# Patient Record
Sex: MALE
[De-identification: names, ages, dates, MRNs, and addresses within clinical notes are randomized per-mention and may not be internally consistent; named-entity substitution may affect disease eponyms.]

---

## 2020-09-24 ENCOUNTER — TRANSCRIPTION ENCOUNTER (OUTPATIENT)
Age: 35
End: 2020-09-24

## 2021-01-11 ENCOUNTER — TRANSCRIPTION ENCOUNTER (OUTPATIENT)
Age: 36
End: 2021-01-11

## 2021-03-12 ENCOUNTER — TRANSCRIPTION ENCOUNTER (OUTPATIENT)
Age: 36
End: 2021-03-12

## 2022-08-29 ENCOUNTER — TRANSCRIPTION ENCOUNTER (OUTPATIENT)
Age: 37
End: 2022-08-29

## 2022-08-29 ENCOUNTER — APPOINTMENT (OUTPATIENT)
Dept: OTOLARYNGOLOGY | Facility: CLINIC | Age: 37
End: 2022-08-29

## 2022-08-29 VITALS
HEART RATE: 67 BPM | WEIGHT: 180 LBS | BODY MASS INDEX: 29.99 KG/M2 | SYSTOLIC BLOOD PRESSURE: 129 MMHG | OXYGEN SATURATION: 98 % | HEIGHT: 65 IN | TEMPERATURE: 97.7 F | DIASTOLIC BLOOD PRESSURE: 84 MMHG

## 2022-08-29 DIAGNOSIS — J34.2 DEVIATED NASAL SEPTUM: ICD-10-CM

## 2022-08-29 DIAGNOSIS — G47.9 SLEEP DISORDER, UNSPECIFIED: ICD-10-CM

## 2022-08-29 DIAGNOSIS — M26.609 UNSPECIFIED TEMPOROMANDIBULAR JOINT DISORDER: ICD-10-CM

## 2022-08-29 PROCEDURE — 99205 OFFICE O/P NEW HI 60 MIN: CPT | Mod: 25

## 2022-08-29 PROCEDURE — 31579 LARYNGOSCOPY TELESCOPIC: CPT

## 2022-08-29 NOTE — ASSESSMENT
[FreeTextEntry1] :  37-year-old gentleman who presents with concern for chronic throat discomfort.  Based on history and physical exam, I do believe there is a component of laryngopharyngeal reflux.  At this time I am recommending dietary and behavioral change to reduce acid in the diet.  I am also recommending omeprazole as well famotidine for a 3 months trial.  \par \par Additionally exam findings and history are also consistent with obstructive sleep apnea.   I am recommending sleep consultation as well as polysomnogram.  In the interim I am also recommending that he continue to use his bite block for his known tooth grinding/TMJ.\par \par   Patient to follow-up in 2 to 3 months, sooner should symptoms worsen or fail to improve\par \par -  sleep medicine consultation, polysomnogram\par -  continue bite block\par - Dietary and behavioral modification to reduce acid reflux, handout given\par - Omeprazole qd as well as Famotidine qhs\par - Voice hygiene, increase hydration, sips of water throughout the day, avoid throat clearing\par - fu 3 mo\par

## 2022-08-29 NOTE — PROCEDURE
[de-identified] : -\par Procedure Note\par \par Pre-operative Diagnosis: Persistent throat discomfort\par Post-operative Diagnosis:  concern for obstructive sleep apnea, LPR, left-sided septal deviation\par Anesthesia: Topical - 1 % Lidocaine/Phenylephrine\par Procedure:  Flexible Laryngoscopy with Stroboscopy\par  \par Procedure Details:  \par The patient was placed in the sitting position.  After decongestant and anesthesia were applied the laryngoscope was passed.  The nasal cavities, nasopharynx, oropharynx, hypopharynx, and larynx were all examined.  Vocal folds were examined during respiration and phonation.  The following findings were noted:\par \par Findings:  \par Nose: Septum is midline, turbinates are normal, nasal airways patent, mucosa normal\par Nasopharynx: Adenoids normal, no masses, eustachian tube normal\par Oropharynx: Pharyngeal walls symmetric and without lesion. Tonsils/fossae symmetric\par Hypopharynx: Hypopharynx and pyriform sinuses without lesion. No masses or asymmetry.  No pooling of secretions.  weakening of the pharyngeal muscles circumferentially\par Larynx:  Epiglottis and aryepiglottic folds were sharp and crisp bilaterally.  Bilateral false vocal folds normal appearance. Airway was widely patent.  + postcricoid edema\par \par Strobe Exam Ratings\par 		\par TVF Appearance:  normal\par TVF Mobility:  normal\par Edema/hypertrophy:  mild\par Mucus on TVF:  minimal\par Glottic Closure:  adequate\par Mucosal Wave:  normal \par Amplitude: Normal\par Phase: symmetric\par Supraglottic Hyperfunction: none\par Other Findings: none\par \par Condition: Stable.  Patient tolerated procedure well.\par \par Complications: None\par

## 2022-08-29 NOTE — HISTORY OF PRESENT ILLNESS
[de-identified] : 38 yo male who presents with \par \par Was in [place], returned July 22nd, since that time fatigue, sore throat, cough.  Symptoms are constant.  Mild odynophagia.  No issues chewing, eating or swallowing.  No breathing issues.  1 Day of dysphonia, but this then improved back to baseline.  Works in sales, and used voice throughout the day.  He was treated with over the counter chloretic sprays.  Went to urgent care on August 22nd, COVID negative, strep was negative, Dx with acid and treated with famotidine.  He has been taking the medication, with very mild improvement.\par \par Diet:\par + 2 caffeinated energy drinks daily, tomato, mint, citrus, carbonated beverages, onion, garlic,  \par - chocolate, blueberries\par 2 glasses of water daily.\par

## 2022-09-08 ENCOUNTER — APPOINTMENT (OUTPATIENT)
Dept: PULMONOLOGY | Facility: CLINIC | Age: 37
End: 2022-09-08

## 2022-09-08 VITALS
HEIGHT: 65 IN | WEIGHT: 180 LBS | HEART RATE: 87 BPM | BODY MASS INDEX: 29.99 KG/M2 | SYSTOLIC BLOOD PRESSURE: 125 MMHG | OXYGEN SATURATION: 98 % | DIASTOLIC BLOOD PRESSURE: 84 MMHG | TEMPERATURE: 97.1 F

## 2022-09-08 DIAGNOSIS — G47.63 SLEEP RELATED BRUXISM: ICD-10-CM

## 2022-09-08 PROCEDURE — 99204 OFFICE O/P NEW MOD 45 MIN: CPT

## 2022-09-08 NOTE — REVIEW OF SYSTEMS
[EDS: ESS=____] : daytime somnolence: ESS=[unfilled] [Fatigue] : fatigue [Snoring] : snoring [Obesity] : obesity [Negative] : Psychiatric

## 2022-09-09 NOTE — HISTORY OF PRESENT ILLNESS
[FreeTextEntry1] : 09/08/2022 :  BHUPENDRA SWARTZ is a 37 year old male with PMHx seasonal  allergy, deviated septum, who is here for initial evaluation for possible sleep apnea. \par \par \par He takes melatonin x 3 years 30 min before bed time and was traveling for work was taking Ambien which he felt  made his snoring worse and now he only takes it occasionally. He had sleep study 6 years ago and was told he has mild-moderate apnea but was never treated with CPAP. \par \par he also reports bruxism and wears . Denies dozing off and has occasional morning HA \par \par Minimal excessive daytime somnolence with [place] sleepiness scale (out of 24 points) = 4. \par \par Denies morning headache, parasomnia, restless legs, leg movements, cataplexy or sleep paralysis. \par

## 2022-09-09 NOTE — PHYSICAL EXAM
[General Appearance - In No Acute Distress] : no acute distress [Normal Oropharynx] : normal oropharynx [III] : III [Neck Appearance] : the appearance of the neck was normal [Neck Cervical Mass (___cm)] : no neck mass was observed [Apical Impulse] : the apical impulse was normal [Heart Sounds] : normal S1 and S2 [Exaggerated Use Of Accessory Muscles For Inspiration] : no accessory muscle use [Abnormal Walk] : normal gait [Involuntary Movements] : no involuntary movements were seen [No Focal Deficits] : no focal deficits [Oriented To Time, Place, And Person] : oriented to person, place, and time [Affect] : the affect was normal [Nail Clubbing] : no clubbing of the fingernails [Cyanosis, Localized] : no localized cyanosis [Petechial Hemorrhages (___cm)] : no petechial hemorrhages [] : no ischemic changes

## 2022-09-09 NOTE — ASSESSMENT
[FreeTextEntry1] : 38 y/o M with snoring, GERD, bruxism who is here for initial visit.   Based on history and physical exam, sleep disordered breathing is at least moderately likely.  The patient was advised to have overnight unattended home sleep testing as a first approach.  If this is not definitive may need overnight polysomnography. Will be seen in follow up after testing.  Bruxism  is common with many sleep disturbances; if has mild to moderate obstructive sleep apnea may consider rx w oral appliance (mandibular advancer) to both treat sleep disordered breathing and protect teeth.

## 2022-09-19 ENCOUNTER — OUTPATIENT (OUTPATIENT)
Dept: OUTPATIENT SERVICES | Facility: HOSPITAL | Age: 37
LOS: 1 days | End: 2022-09-19
Payer: COMMERCIAL

## 2022-09-19 ENCOUNTER — APPOINTMENT (OUTPATIENT)
Dept: SLEEP CENTER | Facility: HOME HEALTH | Age: 37
End: 2022-09-19

## 2022-09-19 PROCEDURE — 95800 SLP STDY UNATTENDED: CPT | Mod: 26

## 2022-09-19 PROCEDURE — 95800 SLP STDY UNATTENDED: CPT

## 2022-09-20 DIAGNOSIS — G47.33 OBSTRUCTIVE SLEEP APNEA (ADULT) (PEDIATRIC): ICD-10-CM

## 2022-10-11 ENCOUNTER — APPOINTMENT (OUTPATIENT)
Dept: PULMONOLOGY | Facility: CLINIC | Age: 37
End: 2022-10-11

## 2022-10-11 DIAGNOSIS — R06.81 APNEA, NOT ELSEWHERE CLASSIFIED: ICD-10-CM

## 2022-10-11 DIAGNOSIS — R06.83 SNORING: ICD-10-CM

## 2022-10-11 DIAGNOSIS — F45.8 OTHER SOMATOFORM DISORDERS: ICD-10-CM

## 2022-10-11 PROCEDURE — 99214 OFFICE O/P EST MOD 30 MIN: CPT | Mod: 95

## 2022-10-11 NOTE — HISTORY OF PRESENT ILLNESS
[FreeTextEntry1] : 09/08/2022 :  BHUPENDRA SWARTZ is a 37 year old male with PMHx seasonal  allergy, deviated septum, who is here for initial evaluation for possible sleep apnea. \par \par He takes melatonin x 3 years 30 min before bed time and was traveling for work was taking Ambien which he felt  made his snoring worse and now he only takes it occasionally. He had sleep study 6 years ago and was told he has mild-moderate apnea but was never treated with CPAP. \par \par he also reports bruxism and wears . Denies dozing off and has occasional morning HA \par \par Minimal excessive daytime somnolence with [place] sleepiness scale (out of 24 points) = 4. \par \par Denies morning headache, parasomnia, restless legs, leg movements, cataplexy or sleep paralysis. \par \par 10/11/22: f/u visit (Telehealth)- reviewed recent unattended home sleep testing with patient-  good sleep quality, snoring only short time when supine, mild sleep disordered breathing with Apnea Hypopnea Index 5.2 (4%), 11.2 (3%).  No significant medical history or changes in medication since last visit. \par

## 2022-10-11 NOTE — ASSESSMENT
[FreeTextEntry1] : Treatment options for sleep disordered breathing were discussed.  The most rapid and successful treatment remains nasal CPAP or BilevelPAP.  Alternatives include upper airway surgery such as uvulopharyngoplasty or a dental appliance (better for milder cases).  Recently hypoglossal nerve stimulation has been used.  Positional therapy (avoidance of supine posture) can be helpful, and all patients should try to maintain a healthy weight and avoid alcohol or other sedating medications close to bedtime \par \par Given mild obstructive sleep apnea and little or no excessive daytime somnolence suggest oral appliance (mandibular advancer) which may also be helpful for his bruxism.  Gave list of dental experts. F/U after dental evaluation.

## 2022-11-23 ENCOUNTER — RX RENEWAL (OUTPATIENT)
Age: 37
End: 2022-11-23

## 2022-11-26 ENCOUNTER — NON-APPOINTMENT (OUTPATIENT)
Age: 37
End: 2022-11-26

## 2022-11-29 ENCOUNTER — APPOINTMENT (OUTPATIENT)
Dept: OTOLARYNGOLOGY | Facility: CLINIC | Age: 37
End: 2022-11-29
Payer: COMMERCIAL

## 2022-11-29 VITALS
TEMPERATURE: 97.4 F | SYSTOLIC BLOOD PRESSURE: 122 MMHG | WEIGHT: 315 LBS | HEIGHT: 65 IN | OXYGEN SATURATION: 97 % | HEART RATE: 82 BPM | BODY MASS INDEX: 52.48 KG/M2 | DIASTOLIC BLOOD PRESSURE: 81 MMHG

## 2022-11-29 DIAGNOSIS — R07.0 PAIN IN THROAT: ICD-10-CM

## 2022-11-29 DIAGNOSIS — K21.9 GASTRO-ESOPHAGEAL REFLUX DISEASE W/OUT ESOPHAGITIS: ICD-10-CM

## 2022-11-29 DIAGNOSIS — R09.81 NASAL CONGESTION: ICD-10-CM

## 2022-11-29 DIAGNOSIS — R49.0 DYSPHONIA: ICD-10-CM

## 2022-11-29 PROCEDURE — 99215 OFFICE O/P EST HI 40 MIN: CPT | Mod: 25

## 2022-11-29 PROCEDURE — 31579 LARYNGOSCOPY TELESCOPIC: CPT

## 2022-11-29 PROCEDURE — 99214 OFFICE O/P EST MOD 30 MIN: CPT | Mod: 25

## 2022-11-30 PROBLEM — K21.9 LARYNGOPHARYNGEAL REFLUX (LPR): Status: ACTIVE | Noted: 2022-08-29

## 2022-11-30 PROBLEM — R07.0 THROAT DISCOMFORT: Status: ACTIVE | Noted: 2022-08-29

## 2022-11-30 PROBLEM — R49.0 DYSPHONIA: Status: ACTIVE | Noted: 2022-11-30

## 2022-11-30 NOTE — REASON FOR VISIT
[Subsequent Evaluation] : a subsequent evaluation for [FreeTextEntry2] : Ear discomfort, throat discomfort

## 2022-11-30 NOTE — ASSESSMENT
[FreeTextEntry1] :  37-year-old gentleman who presents with concern for chronic throat discomfort.  Based on history and physical exam, I do believe there is a component of laryngopharyngeal reflux.  At this time I am recommending dietary and behavioral change to reduce acid in the diet.  I am also recommending omeprazole as well famotidine for a 3 months trial.  The patient has had improvement on this regimen and I am recommending continuing this at this time.\par \par Additionally exam findings and history are also consistent with obstructive sleep apnea.   I am recommending sleep consultation as well as polysomnogram.  In the interim I am also recommending that he continue to use his bite block for his known tooth grinding/TMJ.    Sleep study was done with AHI = 11.  Sleep medicine recommended oral advancement appliance which the patient is already using.\par \par   In terms of throat discomfort, dysphonia as well as ear clogging sensation believe this is consistent with an upper respiratory infection with secondary eustachian tube dysfunction.  There is also evidence of some tonsillar hypertrophy.  At this time I did send off a throat culture.  I am also recommending nasal saline irrigation as well as nasal steroids.  Also recommending an oral Medrol Dosepak as well.  We also briefly reviewed "fly and dive" instructions for acute nasal decongestant.  Patient will follow-up in 2 to 3 weeks for repeat evaluation.  We will have a telehealth in the interim when throat cultures return.\par \par -  rest, hydration, Tylenol/Motrin\par - Nasal saline, nasal steroids\par - Medrol Dosepak\par -  follow-up 2 to 3 weeks, we will plan telehealth to review culture results\par -  continue bite block/  Oral advancement appliance\par - Dietary and behavioral modification to reduce acid reflux, handout given\par - Omeprazole qd as well as Famotidine qhs\par - Voice hygiene, increase hydration, sips of water throughout the day, avoid throat clearing\par - fu 3 mo\par

## 2022-11-30 NOTE — HISTORY OF PRESENT ILLNESS
[de-identified] : 36 yo male who presents with throat discomfort.  Was in [place], returned July 22nd, since that time fatigue, sore throat, cough.  Symptoms are constant.  Mild odynophagia.  No issues chewing, eating or swallowing.  No breathing issues.  1 Day of dysphonia, but this then improved back to baseline.  Works in sales, and used voice throughout the day.  He was treated with over the counter chloretic sprays.  Went to urgent care on August 22nd, COVID negative, strep was negative, Dx with acid and treated with famotidine.  He has been taking the medication, with very mild improvement.\par \par Diet:\par + 2 caffeinated energy drinks daily, tomato, mint, citrus, carbonated beverages, onion, garlic,  \par - chocolate, blueberries\par 2 glasses of water daily.\par - [FreeTextEntry1] :  11/29/2022\par  since the last visit the patient has been adhering to a low reflux diet as well as taking the medications.  He has noted improvement in terms of symptoms.  He also completed a polysomnogram and was found to have mild obstructive sleep apnea.    Minimal treatment including mandibular advancement was offered by sleep medicine.\par \par   Today the patient presents with sore throat throat discomfort ear clogging sensation, pain when swallowing.    He also has dysphonia and hoarseness of voice.  This is been going on for 4 days.  Patient also notes some congestion and postnasal drip.   he has been seen in urgent care and strep was negative.  He was advised to treat with rest, hydration, Tylenol/Motrin.  Today he presents for second opinion.

## 2022-11-30 NOTE — PROCEDURE
[de-identified] : -\par Procedure Note\par \par Pre-operative Diagnosis: Persistent throat discomfort\par Post-operative Diagnosis: concern for obstructive sleep apnea, LPR, left-sided septal deviation,  irritation erythema and edema of the vocal folds small punctate lesion mid right vocal fold\par Anesthesia: Topical - 1 % Lidocaine/Phenylephrine\par Procedure: Flexible Laryngoscopy with Stroboscopy\par  \par Procedure Details: \par The patient was placed in the sitting position. After decongestant and anesthesia were applied the laryngoscope was passed. The nasal cavities, nasopharynx, oropharynx, hypopharynx, and larynx were all examined. Vocal folds were examined during respiration and phonation. The following findings were noted:\par \par Findings: \par Nose: Septum is midline, turbinates are normal, nasal airways patent, mucosa normal\par Nasopharynx: Adenoids normal, no masses, eustachian tube normal\par Oropharynx: Pharyngeal walls symmetric and without lesion. Tonsils/fossae symmetric\par Hypopharynx: Hypopharynx and pyriform sinuses without lesion. No masses or asymmetry. No pooling of secretions. weakening of the pharyngeal muscles circumferentially\par Larynx: Epiglottis and aryepiglottic folds were sharp and crisp bilaterally. Bilateral false vocal folds normal appearance. Airway was widely patent. + postcricoid edema\par \par Strobe Exam Ratings\par 		\par TVF Appearance:  irritation erythema and edema of the vocal folds small punctate lesion mid right vocal fold\par TVF Mobility: normal\par Edema/hypertrophy: mild\par Mucus on TVF: minimal\par Glottic Closure: adequate\par Mucosal Wave: normal \par Amplitude: Normal\par Phase: symmetric\par Supraglottic Hyperfunction: none\par Other Findings: none\par \par Condition: Stable. Patient tolerated procedure well.\par \par Complications: None\par

## 2022-11-30 NOTE — PHYSICAL EXAM
[FreeTextEntry1] :   Hoarse and raspy voice [Nasal Endoscopy Performed] : nasal endoscopy was performed, see procedure section for findings [] : septum deviated to the left [Midline] : trachea located in midline position [Laryngoscopy Performed] : laryngoscopy was performed, see procedure section for findings [Normal] : no rashes

## 2022-12-13 ENCOUNTER — APPOINTMENT (OUTPATIENT)
Dept: OTOLARYNGOLOGY | Facility: CLINIC | Age: 37
End: 2022-12-13

## 2022-12-13 VITALS
HEART RATE: 86 BPM | SYSTOLIC BLOOD PRESSURE: 119 MMHG | BODY MASS INDEX: 28.16 KG/M2 | TEMPERATURE: 96.7 F | HEIGHT: 65 IN | DIASTOLIC BLOOD PRESSURE: 79 MMHG | WEIGHT: 169 LBS

## 2022-12-13 DIAGNOSIS — J06.9 ACUTE UPPER RESPIRATORY INFECTION, UNSPECIFIED: ICD-10-CM

## 2022-12-13 LAB — EAR NOSE AND THROAT CULTURE: ABNORMAL

## 2022-12-13 PROCEDURE — 99213 OFFICE O/P EST LOW 20 MIN: CPT

## 2022-12-13 NOTE — ASSESSMENT
[FreeTextEntry1] :  37-year-old gentleman who presents with concern for chronic throat discomfort.  Based on history and physical exam, I do believe there is a component of laryngopharyngeal reflux.  At this time I am recommending dietary and behavioral change to reduce acid in the diet.  I am also recommending omeprazole as well famotidine for a 3 months trial.  The patient has had improvement on this regimen and I am recommending continuing this at this time.\par \par Additionally exam findings and history are also consistent with obstructive sleep apnea.   I am recommending sleep consultation as well as polysomnogram.  In the interim I am also recommending that he continue to use his bite block for his known tooth grinding/TMJ.    Sleep study was done with AHI = 11.  Sleep medicine recommended oral advancement appliance which the patient is already using.\par \par In terms of recent upper respiratory infection patient feels completely back to baseline   After using conservative treatments.  Culture was reviewed with essentially normal respiratory keyla.  Therefore I do suspect that this was viral in etiology.\par \par -  continue bite block/  Oral advancement appliance\par - Dietary and behavioral modification to reduce acid reflux, handout given\par - Omeprazole qd as well as Famotidine qhs\par - Voice hygiene, increase hydration, sips of water throughout the day, avoid throat clearing\par - fu 3 mo\par

## 2022-12-13 NOTE — REASON FOR VISIT
[Subsequent Evaluation] : a subsequent evaluation for [FreeTextEntry2] : Upper respiratory infection

## 2022-12-13 NOTE — HISTORY OF PRESENT ILLNESS
[de-identified] : 36 yo male who presents with throat discomfort.  Was in [place], returned July 22nd, since that time fatigue, sore throat, cough.  Symptoms are constant.  Mild odynophagia.  No issues chewing, eating or swallowing.  No breathing issues.  1 Day of dysphonia, but this then improved back to baseline.  Works in sales, and used voice throughout the day.  He was treated with over the counter chloretic sprays.  Went to urgent care on August 22nd, COVID negative, strep was negative, Dx with acid and treated with famotidine.  He has been taking the medication, with very mild improvement.\par \par Diet:\par + 2 caffeinated energy drinks daily, tomato, mint, citrus, carbonated beverages, onion, garlic,  \par - chocolate, blueberries\par 2 glasses of water daily.\par - [FreeTextEntry1] :  11/29/2022\par  since the last visit the patient has been adhering to a low reflux diet as well as taking the medications.  He has noted improvement in terms of symptoms.  He also completed a polysomnogram and was found to have mild obstructive sleep apnea.    Minimal treatment including mandibular advancement was offered by sleep medicine.\par \par   Today the patient presents with sore throat throat discomfort ear clogging sensation, pain when swallowing.    He also has dysphonia and hoarseness of voice.  This is been going on for 4 days.  Patient also notes some congestion and postnasal drip.   he has been seen in urgent care and strep was negative.  He was advised to treat with rest, hydration, Tylenol/Motrin.  Today he presents for second opinion.\par –\par 12/13/2022\par  since last visit the patient has been adhering to rest, hydration, Tylenol/Motrin.  He did use nasal saline and nasal steroids.  He also did the oral steroids.  He did not try the "fly and dive" instructions.  He notes complete resolution of symptoms.  He feels completely back to baseline.

## 2023-08-30 ENCOUNTER — TRANSCRIPTION ENCOUNTER (OUTPATIENT)
Age: 38
End: 2023-08-30

## 2023-08-30 RX ORDER — OMEPRAZOLE 40 MG/1
40 CAPSULE, DELAYED RELEASE ORAL
Qty: 90 | Refills: 1 | Status: ACTIVE | COMMUNITY
Start: 2022-08-29 | End: 1900-01-01

## 2023-08-30 RX ORDER — FAMOTIDINE 20 MG/1
20 TABLET, FILM COATED ORAL
Qty: 90 | Refills: 1 | Status: ACTIVE | COMMUNITY
Start: 2022-08-29 | End: 1900-01-01

## 2024-04-15 ENCOUNTER — APPOINTMENT (OUTPATIENT)
Dept: INTERNAL MEDICINE | Facility: CLINIC | Age: 39
End: 2024-04-15
Payer: COMMERCIAL

## 2024-04-15 VITALS
WEIGHT: 177 LBS | BODY MASS INDEX: 29.49 KG/M2 | SYSTOLIC BLOOD PRESSURE: 120 MMHG | TEMPERATURE: 98.5 F | DIASTOLIC BLOOD PRESSURE: 70 MMHG | OXYGEN SATURATION: 98 % | HEART RATE: 69 BPM | RESPIRATION RATE: 12 BRPM | HEIGHT: 65 IN

## 2024-04-15 DIAGNOSIS — Z78.9 OTHER SPECIFIED HEALTH STATUS: ICD-10-CM

## 2024-04-15 DIAGNOSIS — L65.9 NONSCARRING HAIR LOSS, UNSPECIFIED: ICD-10-CM

## 2024-04-15 DIAGNOSIS — Z00.00 ENCOUNTER FOR GENERAL ADULT MEDICAL EXAMINATION W/OUT ABNORMAL FINDINGS: ICD-10-CM

## 2024-04-15 DIAGNOSIS — F32.A DEPRESSION, UNSPECIFIED: ICD-10-CM

## 2024-04-15 DIAGNOSIS — Z87.891 PERSONAL HISTORY OF NICOTINE DEPENDENCE: ICD-10-CM

## 2024-04-15 PROCEDURE — 99385 PREV VISIT NEW AGE 18-39: CPT | Mod: 25

## 2024-04-15 PROCEDURE — G0444 DEPRESSION SCREEN ANNUAL: CPT | Mod: 59

## 2024-04-15 RX ORDER — METHYLPREDNISOLONE 4 MG/1
4 TABLET ORAL
Qty: 1 | Refills: 0 | Status: COMPLETED | COMMUNITY
Start: 2022-11-29 | End: 2024-04-15

## 2024-04-15 RX ORDER — FLUTICASONE PROPIONATE 50 UG/1
50 SPRAY, METERED NASAL DAILY
Qty: 1 | Refills: 3 | Status: COMPLETED | COMMUNITY
Start: 2022-11-29 | End: 2024-04-15

## 2024-04-15 RX ORDER — FINASTERIDE 1 MG/1
1 TABLET ORAL
Refills: 0 | Status: ACTIVE | COMMUNITY

## 2024-04-15 RX ORDER — AMOXICILLIN 875 MG/1
875 TABLET, FILM COATED ORAL
Qty: 20 | Refills: 0 | Status: COMPLETED | COMMUNITY
Start: 2023-10-30

## 2024-04-15 NOTE — REVIEW OF SYSTEMS
[Fever] : no fever [Chills] : no chills [Night Sweats] : no night sweats [Discharge] : no discharge [Vision Problems] : no vision problems [Itching] : no itching [Earache] : no earache [Nasal Discharge] : no nasal discharge [Sore Throat] : no sore throat [Chest Pain] : no chest pain [Palpitations] : no palpitations [Lower Ext Edema] : no lower extremity edema [Shortness Of Breath] : no shortness of breath [Wheezing] : no wheezing [Cough] : no cough [Dyspnea on Exertion] : not dyspnea on exertion [Abdominal Pain] : no abdominal pain [Nausea] : no nausea [Constipation] : no constipation [Diarrhea] : no diarrhea [Vomiting] : no vomiting [Melena] : no melena [Dysuria] : no dysuria [Muscle Pain] : no muscle pain [Muscle Weakness] : no muscle weakness [Itching] : no itching [Skin Rash] : no skin rash [Headache] : no headache [Dizziness] : no dizziness [Suicidal] : not suicidal [Anxiety] : no anxiety [Depression] : no depression [Easy Bleeding] : no easy bleeding [Easy Bruising] : no easy bruising [Swollen Glands] : no swollen glands [FreeTextEntry4] : TMJ

## 2024-04-15 NOTE — HEALTH RISK ASSESSMENT
[Good] : ~his/her~  mood as  good [Yes] : Yes [2 - 4 times a month (2 pts)] : 2-4 times a month (2 points) [1/2 of Days or More (2)] : 6.) Feeling bad about yourself, or that you are a failure, or have let yourself or your family down? Half the days or more [Several Days (1)] : 7.) Trouble concentrating on things, such as reading a newspaper or watching television? Several days [Not at All (0)] : 9.) Thoughts that you would be off dead or of hurting yourself in some way? Not at all [Moderate] : Severity of Depression is Moderate [Somewhat Difficult] : How difficult have these problems made it for you to do your work, take care of things at home, or get along with people? Somewhat difficult [PHQ-9 Positive] : PHQ-9 Positive [I have developed a follow-up plan documented below in the note.] : I have developed a follow-up plan documented below in the note. 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

## 2024-04-15 NOTE — ASSESSMENT
[FreeTextEntry1] : TMJ: Referred to [facility].  HCM: Check labs. Will discuss results.  Depression: Denies SI. Referred to behavioral health. I spent __5__ minutes with the patient conducting a screen using approved screening tool (PHQ-9) and discussing results of said screen with patient during this encounter.

## 2024-04-16 LAB
ALBUMIN SERPL ELPH-MCNC: 4.8 G/DL
ALP BLD-CCNC: 62 U/L
ALT SERPL-CCNC: 27 U/L
ANION GAP SERPL CALC-SCNC: 13 MMOL/L
AST SERPL-CCNC: 20 U/L
BASOPHILS # BLD AUTO: 0.05 K/UL
BASOPHILS NFR BLD AUTO: 0.7 %
BILIRUB SERPL-MCNC: 0.2 MG/DL
BUN SERPL-MCNC: 15 MG/DL
CALCIUM SERPL-MCNC: 9.5 MG/DL
CHLORIDE SERPL-SCNC: 102 MMOL/L
CHOLEST SERPL-MCNC: 225 MG/DL
CO2 SERPL-SCNC: 26 MMOL/L
CREAT SERPL-MCNC: 0.9 MG/DL
EGFR: 114 ML/MIN/1.73M2
EOSINOPHIL # BLD AUTO: 0.16 K/UL
EOSINOPHIL NFR BLD AUTO: 2.4 %
ESTIMATED AVERAGE GLUCOSE: 117 MG/DL
GLUCOSE SERPL-MCNC: 89 MG/DL
HBA1C MFR BLD HPLC: 5.7 %
HCT VFR BLD CALC: 42.8 %
HDLC SERPL-MCNC: 67 MG/DL
HGB BLD-MCNC: 14 G/DL
IMM GRANULOCYTES NFR BLD AUTO: 0.3 %
LDLC SERPL CALC-MCNC: 134 MG/DL
LYMPHOCYTES # BLD AUTO: 1.44 K/UL
LYMPHOCYTES NFR BLD AUTO: 21.3 %
MAN DIFF?: NORMAL
MCHC RBC-ENTMCNC: 29.1 PG
MCHC RBC-ENTMCNC: 32.7 GM/DL
MCV RBC AUTO: 89 FL
MONOCYTES # BLD AUTO: 0.56 K/UL
MONOCYTES NFR BLD AUTO: 8.3 %
NEUTROPHILS # BLD AUTO: 4.53 K/UL
NEUTROPHILS NFR BLD AUTO: 67 %
NONHDLC SERPL-MCNC: 158 MG/DL
PLATELET # BLD AUTO: 282 K/UL
POTASSIUM SERPL-SCNC: 4 MMOL/L
PROT SERPL-MCNC: 7.2 G/DL
RBC # BLD: 4.81 M/UL
RBC # FLD: 13.6 %
SODIUM SERPL-SCNC: 141 MMOL/L
TRIGL SERPL-MCNC: 135 MG/DL
TSH SERPL-ACNC: 1.3 UIU/ML
WBC # FLD AUTO: 6.76 K/UL

## 2024-04-22 ENCOUNTER — TRANSCRIPTION ENCOUNTER (OUTPATIENT)
Age: 39
End: 2024-04-22

## 2024-08-07 ENCOUNTER — NON-APPOINTMENT (OUTPATIENT)
Age: 39
End: 2024-08-07

## 2024-08-07 ENCOUNTER — APPOINTMENT (OUTPATIENT)
Dept: DERMATOLOGY | Facility: CLINIC | Age: 39
End: 2024-08-07

## 2024-08-07 PROBLEM — Z87.2 HISTORY OF ALOPECIA: Status: RESOLVED | Noted: 2024-04-15 | Resolved: 2024-08-07

## 2024-08-07 PROBLEM — D18.01 CHERRY ANGIOMA: Status: ACTIVE | Noted: 2024-08-07

## 2024-08-07 PROBLEM — L64.9 ANDROGENETIC ALOPECIA: Status: ACTIVE | Noted: 2024-08-07

## 2024-08-07 PROBLEM — D22.9 MULTIPLE BENIGN MELANOCYTIC NEVI: Status: ACTIVE | Noted: 2024-08-07

## 2024-08-07 PROBLEM — L81.4 LENTIGINES: Status: ACTIVE | Noted: 2024-08-07

## 2024-08-07 PROBLEM — Z80.8 FAMILY HISTORY OF SQUAMOUS CELL CARCINOMA OF SKIN: Status: ACTIVE | Noted: 2024-08-07

## 2024-08-07 PROCEDURE — 99204 OFFICE O/P NEW MOD 45 MIN: CPT

## 2024-08-07 NOTE — PHYSICAL EXAM
[Alert] : alert [Oriented x 3] : ~L oriented x 3 [Well Nourished] : well nourished [Full Body Skin Exam Performed] : performed [FreeTextEntry3] : A full skin exam was performed including the scalp, face (including lips, ears, nose and eyes), neck, chest, abdomen, back, buttocks, upper extremities and lower extremities.  The patient declined examination of the genitalia.   The exam revealed the following benign growths: Kidder pigmented nevi. Lentigines. Cherry angioma.  Significant regression of the frontal hairline, bilateral temporal regions, with vellus hairs at the margins.  Slight decrease in the density of the vertex.

## 2024-08-07 NOTE — ASSESSMENT
[FreeTextEntry1] : A complete skin examination was performed.  There is no evidence of skin cancer.  We discussed the importance of photoprotection, including the use of hats, protective clothing and sunscreens with an SPF of at least 30.  Sun avoidance was also discussed.  The ABCDE's of melanoma was discussed.  Regular annual skin exams recommended.  Androgenetic alopecia Discussed at great length. Continue finasteride 1 mg daily. Add minoxidil 2.5 mg qd. Will follow.

## 2024-08-07 NOTE — HISTORY OF PRESENT ILLNESS
[FreeTextEntry1] : Patient presents for skin examination. [de-identified] : Patient recently moved to the region.  He's had hair loss since college, on propecia for many years, with slowing of the hairloss, but still with regression of the frontal hairline.

## 2024-12-25 PROBLEM — F10.90 ALCOHOL USE: Status: ACTIVE | Noted: 2024-04-15

## 2025-01-03 ENCOUNTER — TRANSCRIPTION ENCOUNTER (OUTPATIENT)
Age: 40
End: 2025-01-03

## 2025-01-16 ENCOUNTER — APPOINTMENT (OUTPATIENT)
Dept: INTERNAL MEDICINE | Facility: CLINIC | Age: 40
End: 2025-01-16
Payer: COMMERCIAL

## 2025-01-16 VITALS
DIASTOLIC BLOOD PRESSURE: 76 MMHG | HEART RATE: 74 BPM | HEIGHT: 65 IN | BODY MASS INDEX: 30.66 KG/M2 | RESPIRATION RATE: 14 BRPM | SYSTOLIC BLOOD PRESSURE: 130 MMHG | TEMPERATURE: 98.2 F | OXYGEN SATURATION: 97 % | WEIGHT: 184 LBS

## 2025-01-16 DIAGNOSIS — R73.03 PREDIABETES.: ICD-10-CM

## 2025-01-16 DIAGNOSIS — E66.9 OBESITY, UNSPECIFIED: ICD-10-CM

## 2025-01-16 PROCEDURE — G2211 COMPLEX E/M VISIT ADD ON: CPT | Mod: NC

## 2025-01-16 PROCEDURE — 99213 OFFICE O/P EST LOW 20 MIN: CPT

## 2025-01-21 RX ORDER — SEMAGLUTIDE 0.25 MG/.5ML
0.25 INJECTION, SOLUTION SUBCUTANEOUS
Qty: 1 | Refills: 1 | Status: ACTIVE | COMMUNITY
Start: 2025-01-16

## 2025-03-15 ENCOUNTER — TRANSCRIPTION ENCOUNTER (OUTPATIENT)
Age: 40
End: 2025-03-15

## 2025-03-24 ENCOUNTER — TRANSCRIPTION ENCOUNTER (OUTPATIENT)
Age: 40
End: 2025-03-24

## 2025-04-17 ENCOUNTER — TRANSCRIPTION ENCOUNTER (OUTPATIENT)
Age: 40
End: 2025-04-17

## 2025-06-16 ENCOUNTER — TRANSCRIPTION ENCOUNTER (OUTPATIENT)
Age: 40
End: 2025-06-16

## 2025-07-22 ENCOUNTER — APPOINTMENT (OUTPATIENT)
Dept: INTERNAL MEDICINE | Facility: CLINIC | Age: 40
End: 2025-07-22
Payer: COMMERCIAL

## 2025-07-22 VITALS
OXYGEN SATURATION: 99 % | HEIGHT: 65 IN | SYSTOLIC BLOOD PRESSURE: 108 MMHG | RESPIRATION RATE: 15 BRPM | DIASTOLIC BLOOD PRESSURE: 76 MMHG | WEIGHT: 160 LBS | BODY MASS INDEX: 26.66 KG/M2 | TEMPERATURE: 98.3 F | HEART RATE: 74 BPM

## 2025-07-22 DIAGNOSIS — E66.9 OBESITY, UNSPECIFIED: ICD-10-CM

## 2025-07-22 PROCEDURE — 99214 OFFICE O/P EST MOD 30 MIN: CPT

## 2025-07-22 PROCEDURE — G2211 COMPLEX E/M VISIT ADD ON: CPT | Mod: NC

## 2025-07-22 RX ORDER — TIRZEPATIDE 5 MG/.5ML
5 INJECTION, SOLUTION SUBCUTANEOUS
Qty: 3 | Refills: 0 | Status: ACTIVE | COMMUNITY
Start: 2025-07-22 | End: 1900-01-01

## 2025-07-23 ENCOUNTER — TRANSCRIPTION ENCOUNTER (OUTPATIENT)
Age: 40
End: 2025-07-23

## 2025-07-23 LAB
ALBUMIN SERPL ELPH-MCNC: 4.6 G/DL
ALP BLD-CCNC: 76 U/L
ALT SERPL-CCNC: 24 U/L
ANION GAP SERPL CALC-SCNC: 14 MMOL/L
AST SERPL-CCNC: 17 U/L
BILIRUB SERPL-MCNC: 0.4 MG/DL
BUN SERPL-MCNC: 13 MG/DL
CALCIUM SERPL-MCNC: 9.7 MG/DL
CHLORIDE SERPL-SCNC: 104 MMOL/L
CO2 SERPL-SCNC: 23 MMOL/L
CREAT SERPL-MCNC: 0.97 MG/DL
EGFRCR SERPLBLD CKD-EPI 2021: 103 ML/MIN/1.73M2
GLUCOSE SERPL-MCNC: 89 MG/DL
HCT VFR BLD CALC: 41.6 %
HGB BLD-MCNC: 13.7 G/DL
LPL SERPL-CCNC: 32 U/L
MCHC RBC-ENTMCNC: 28.8 PG
MCHC RBC-ENTMCNC: 32.9 G/DL
MCV RBC AUTO: 87.4 FL
PLATELET # BLD AUTO: 299 K/UL
POTASSIUM SERPL-SCNC: 4.8 MMOL/L
PROT SERPL-MCNC: 7.5 G/DL
RBC # BLD: 4.76 M/UL
RBC # FLD: 13.4 %
SODIUM SERPL-SCNC: 141 MMOL/L
WBC # FLD AUTO: 4.14 K/UL

## 2025-08-06 ENCOUNTER — APPOINTMENT (OUTPATIENT)
Dept: DERMATOLOGY | Facility: CLINIC | Age: 40
End: 2025-08-06

## 2025-08-06 DIAGNOSIS — D18.01 HEMANGIOMA OF SKIN AND SUBCUTANEOUS TISSUE: ICD-10-CM

## 2025-08-06 DIAGNOSIS — D22.9 MELANOCYTIC NEVI, UNSPECIFIED: ICD-10-CM

## 2025-08-06 DIAGNOSIS — L70.9 ACNE, UNSPECIFIED: ICD-10-CM

## 2025-08-06 DIAGNOSIS — L64.9 ANDROGENIC ALOPECIA, UNSPECIFIED: ICD-10-CM

## 2025-08-06 DIAGNOSIS — L81.4 OTHER MELANIN HYPERPIGMENTATION: ICD-10-CM

## 2025-08-06 PROCEDURE — 99214 OFFICE O/P EST MOD 30 MIN: CPT

## 2025-08-18 ENCOUNTER — TRANSCRIPTION ENCOUNTER (OUTPATIENT)
Age: 40
End: 2025-08-18